# Patient Record
Sex: FEMALE
[De-identification: names, ages, dates, MRNs, and addresses within clinical notes are randomized per-mention and may not be internally consistent; named-entity substitution may affect disease eponyms.]

---

## 2022-08-31 ENCOUNTER — HOSPITAL ENCOUNTER (EMERGENCY)
Dept: HOSPITAL 97 - ER | Age: 52
Discharge: HOME | End: 2022-08-31
Payer: SELF-PAY
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
## 2022-08-31 DIAGNOSIS — R51.9: ICD-10-CM

## 2022-08-31 DIAGNOSIS — R07.9: Primary | ICD-10-CM

## 2022-08-31 PROCEDURE — 70450 CT HEAD/BRAIN W/O DYE: CPT

## 2022-08-31 PROCEDURE — 71045 X-RAY EXAM CHEST 1 VIEW: CPT
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
## 2022-08-31 NOTE — RAD REPORT
EXAM DESCRIPTION:  Gladys Single View8/31/2022 7:29 pm

 

CLINICAL HISTORY:  Chest pain

 

COMPARISON:  2017

 

FINDINGS:   The lungs appear clear of acute infiltrate. The heart is normal size

 

IMPRESSION:   No acute abnormalities displayed
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
## 2022-09-01 VITALS — DIASTOLIC BLOOD PRESSURE: 69 MMHG | TEMPERATURE: 98.8 F | SYSTOLIC BLOOD PRESSURE: 121 MMHG | OXYGEN SATURATION: 99 %
